# Patient Record
Sex: MALE | Race: WHITE | NOT HISPANIC OR LATINO
[De-identification: names, ages, dates, MRNs, and addresses within clinical notes are randomized per-mention and may not be internally consistent; named-entity substitution may affect disease eponyms.]

---

## 2023-04-05 ENCOUNTER — RX ONLY (OUTPATIENT)
Age: 58
Setting detail: RX ONLY
End: 2023-04-05

## 2023-07-06 ENCOUNTER — APPOINTMENT (RX ONLY)
Dept: RURAL CLINIC 4 | Facility: CLINIC | Age: 58
Setting detail: DERMATOLOGY
End: 2023-07-06

## 2023-07-06 DIAGNOSIS — L82.1 OTHER SEBORRHEIC KERATOSIS: ICD-10-CM

## 2023-07-06 DIAGNOSIS — L81.4 OTHER MELANIN HYPERPIGMENTATION: ICD-10-CM

## 2023-07-06 DIAGNOSIS — L57.3 POIKILODERMA OF CIVATTE: ICD-10-CM | Status: INADEQUATELY CONTROLLED

## 2023-07-06 DIAGNOSIS — D49.2 NEOPLASM OF UNSPECIFIED BEHAVIOR OF BONE, SOFT TISSUE, AND SKIN: ICD-10-CM

## 2023-07-06 DIAGNOSIS — D22 MELANOCYTIC NEVI: ICD-10-CM

## 2023-07-06 DIAGNOSIS — D18.0 HEMANGIOMA: ICD-10-CM

## 2023-07-06 DIAGNOSIS — L85.3 XEROSIS CUTIS: ICD-10-CM

## 2023-07-06 DIAGNOSIS — L57.8 OTHER SKIN CHANGES DUE TO CHRONIC EXPOSURE TO NONIONIZING RADIATION: ICD-10-CM | Status: STABLE

## 2023-07-06 PROBLEM — D18.01 HEMANGIOMA OF SKIN AND SUBCUTANEOUS TISSUE: Status: ACTIVE | Noted: 2023-07-06

## 2023-07-06 PROBLEM — D22.5 MELANOCYTIC NEVI OF TRUNK: Status: ACTIVE | Noted: 2023-07-06

## 2023-07-06 PROCEDURE — 11102 TANGNTL BX SKIN SINGLE LES: CPT

## 2023-07-06 PROCEDURE — ? COUNSELING

## 2023-07-06 PROCEDURE — ? SUNSCREEN RECOMMENDATIONS

## 2023-07-06 PROCEDURE — ? BIOPSY BY SHAVE METHOD

## 2023-07-06 PROCEDURE — 99203 OFFICE O/P NEW LOW 30 MIN: CPT | Mod: 25

## 2023-07-06 ASSESSMENT — LOCATION DETAILED DESCRIPTION DERM
LOCATION DETAILED: SUPERIOR THORACIC SPINE
LOCATION DETAILED: RIGHT PROXIMAL DORSAL FOREARM
LOCATION DETAILED: LEFT INFERIOR CENTRAL MALAR CHEEK
LOCATION DETAILED: RIGHT POSTERIOR SHOULDER
LOCATION DETAILED: SUPERIOR MID FOREHEAD
LOCATION DETAILED: LEFT LATERAL EYEBROW
LOCATION DETAILED: INFERIOR THORACIC SPINE
LOCATION DETAILED: LEFT PROXIMAL DORSAL FOREARM
LOCATION DETAILED: EPIGASTRIC SKIN
LOCATION DETAILED: LEFT INFERIOR LATERAL NECK
LOCATION DETAILED: LEFT POSTERIOR SHOULDER

## 2023-07-06 ASSESSMENT — LOCATION SIMPLE DESCRIPTION DERM
LOCATION SIMPLE: ABDOMEN
LOCATION SIMPLE: LEFT FOREARM
LOCATION SIMPLE: LEFT ANTERIOR NECK
LOCATION SIMPLE: UPPER BACK
LOCATION SIMPLE: RIGHT FOREARM
LOCATION SIMPLE: SUPERIOR FOREHEAD
LOCATION SIMPLE: LEFT EYEBROW
LOCATION SIMPLE: LEFT SHOULDER
LOCATION SIMPLE: RIGHT SHOULDER
LOCATION SIMPLE: LEFT CHEEK

## 2023-07-06 ASSESSMENT — LOCATION ZONE DERM
LOCATION ZONE: TRUNK
LOCATION ZONE: NECK
LOCATION ZONE: FACE
LOCATION ZONE: ARM

## 2023-07-06 NOTE — PROCEDURE: BIOPSY BY SHAVE METHOD
Detail Level: Detailed
Depth Of Biopsy: dermis
Was A Bandage Applied: Yes
Size Of Lesion In Cm: 0
Biopsy Type: H and E
Biopsy Method: Dermablade
Anesthesia Type: 1% lidocaine with epinephrine
Anesthesia Volume In Cc (Will Not Render If 0): 0.5
Hemostasis: Drysol
Wound Care: Petrolatum
Dressing: bandage
Destruction After The Procedure: No
Type Of Destruction Used: Curettage
Curettage Text: The wound bed was treated with curettage after the biopsy was performed.
Cryotherapy Text: The wound bed was treated with cryotherapy after the biopsy was performed.
Electrodesiccation Text: The wound bed was treated with electrodesiccation after the biopsy was performed.
Electrodesiccation And Curettage Text: The wound bed was treated with electrodesiccation and curettage after the biopsy was performed.
Silver Nitrate Text: The wound bed was treated with silver nitrate after the biopsy was performed.
Lab: [address] [place] [id number] [place]
Consent: Written consent was obtained and risks were reviewed including but not limited to scarring, infection, bleeding, scabbing, incomplete removal, nerve damage and allergy to anesthesia.
Post-Care Instructions: I reviewed with the patient in detail post-care instructions. Patient is to keep the biopsy site dry overnight, and then apply bacitracin twice daily until healed. Patient may apply hydrogen peroxide soaks to remove any crusting.
Notification Instructions: Patient will be notified of biopsy results. However, patient instructed to call the office if not contacted within 2 weeks.
Billing Type: [employer]
Information: Selecting Yes will display possible errors in your note based on the variables you have selected. This validation is only offered as a suggestion for you. PLEASE NOTE THAT THE VALIDATION TEXT WILL BE REMOVED WHEN YOU FINALIZE YOUR NOTE. IF YOU WANT TO FAX A PRELIMINARY NOTE YOU WILL NEED TO TOGGLE THIS TO 'NO' IF YOU DO NOT WANT IT IN YOUR FAXED NOTE.

## 2024-07-22 ENCOUNTER — APPOINTMENT (RX ONLY)
Dept: RURAL CLINIC 4 | Facility: CLINIC | Age: 59
Setting detail: DERMATOLOGY
End: 2024-07-22

## 2024-07-22 DIAGNOSIS — L82.1 OTHER SEBORRHEIC KERATOSIS: ICD-10-CM

## 2024-07-22 DIAGNOSIS — D18.0 HEMANGIOMA: ICD-10-CM

## 2024-07-22 DIAGNOSIS — D22 MELANOCYTIC NEVI: ICD-10-CM

## 2024-07-22 DIAGNOSIS — D49.2 NEOPLASM OF UNSPECIFIED BEHAVIOR OF BONE, SOFT TISSUE, AND SKIN: ICD-10-CM

## 2024-07-22 DIAGNOSIS — L57.8 OTHER SKIN CHANGES DUE TO CHRONIC EXPOSURE TO NONIONIZING RADIATION: ICD-10-CM | Status: STABLE

## 2024-07-22 DIAGNOSIS — L81.4 OTHER MELANIN HYPERPIGMENTATION: ICD-10-CM

## 2024-07-22 PROBLEM — D22.5 MELANOCYTIC NEVI OF TRUNK: Status: ACTIVE | Noted: 2024-07-22

## 2024-07-22 PROBLEM — D18.01 HEMANGIOMA OF SKIN AND SUBCUTANEOUS TISSUE: Status: ACTIVE | Noted: 2024-07-22

## 2024-07-22 PROCEDURE — ? SUNSCREEN RECOMMENDATIONS

## 2024-07-22 PROCEDURE — 11307 SHAVE SKIN LESION 1.1-2.0 CM: CPT

## 2024-07-22 PROCEDURE — ? COUNSELING

## 2024-07-22 PROCEDURE — 99213 OFFICE O/P EST LOW 20 MIN: CPT | Mod: 25

## 2024-07-22 PROCEDURE — ? SHAVE REMOVAL

## 2024-07-22 ASSESSMENT — LOCATION ZONE DERM
LOCATION ZONE: FACE
LOCATION ZONE: TRUNK
LOCATION ZONE: ARM
LOCATION ZONE: HAND

## 2024-07-22 ASSESSMENT — LOCATION SIMPLE DESCRIPTION DERM
LOCATION SIMPLE: RIGHT HAND
LOCATION SIMPLE: RIGHT UPPER BACK
LOCATION SIMPLE: LEFT UPPER BACK
LOCATION SIMPLE: LEFT FOREARM
LOCATION SIMPLE: LEFT FOREHEAD
LOCATION SIMPLE: RIGHT CHEEK

## 2024-07-22 ASSESSMENT — LOCATION DETAILED DESCRIPTION DERM
LOCATION DETAILED: RIGHT INFERIOR CENTRAL MALAR CHEEK
LOCATION DETAILED: RIGHT MID-UPPER BACK
LOCATION DETAILED: RIGHT RADIAL DORSAL HAND
LOCATION DETAILED: RIGHT INFERIOR UPPER BACK
LOCATION DETAILED: LEFT PROXIMAL DORSAL FOREARM
LOCATION DETAILED: LEFT INFERIOR MEDIAL FOREHEAD
LOCATION DETAILED: LEFT MID-UPPER BACK

## 2024-07-22 NOTE — PROCEDURE: SHAVE REMOVAL
Medical Necessity Information: It is in your best interest to select a reason for this procedure from the list below. All of these items fulfill various CMS LCD requirements except the new and changing color options.
Medical Necessity Clause: This procedure was medically necessary because the lesion that was treated was:
Lab: [id number]
Lab Facility: [id number]
Detail Level: Detailed
Was A Bandage Applied: Yes
Size Of Lesion In Cm (Required): 1.2
X Size Of Lesion In Cm (Optional): 0
Depth Of Shave: dermis
Biopsy Method: Dermablade
Anesthesia Type: 1% lidocaine with epinephrine
Hemostasis: Drysol
Wound Care: Petrolatum
Path Notes (To The Dermatopathologist): Please check margins. Thank you.
Render Path Notes In Note?: No
Consent was obtained from the patient. The provider's intent is to completely remove the lesion. The risks and benefits to therapy were discussed in detail. Specifically, the risks of infection, scarring, bleeding, prolonged wound healing, incomplete removal, allergy to anesthesia, nerve injury and recurrence were addressed. Prior to the procedure, the treatment site was clearly identified and confirmed by the patient. All components of Universal Protocol/PAUSE Rule completed.
Post-Care Instructions: I reviewed with the patient in detail post-care instructions. Patient is to keep the biopsy site dry overnight, and then apply bacitracin twice daily until healed. Patient may apply hydrogen peroxide soaks to remove any crusting.
Notification Instructions: Patient will be notified of pathology results. However, patient instructed to call the office if not contacted within 2 weeks.
Billing Type: Third-Party Bill

## 2025-07-22 ENCOUNTER — APPOINTMENT (OUTPATIENT)
Dept: RURAL CLINIC 4 | Facility: CLINIC | Age: 60
Setting detail: DERMATOLOGY
End: 2025-07-22

## 2025-07-22 DIAGNOSIS — L82.1 OTHER SEBORRHEIC KERATOSIS: ICD-10-CM

## 2025-07-22 DIAGNOSIS — L81.4 OTHER MELANIN HYPERPIGMENTATION: ICD-10-CM

## 2025-07-22 DIAGNOSIS — D18.0 HEMANGIOMA: ICD-10-CM

## 2025-07-22 DIAGNOSIS — D22 MELANOCYTIC NEVI: ICD-10-CM

## 2025-07-22 DIAGNOSIS — L57.8 OTHER SKIN CHANGES DUE TO CHRONIC EXPOSURE TO NONIONIZING RADIATION: ICD-10-CM | Status: STABLE

## 2025-07-22 DIAGNOSIS — D49.2 NEOPLASM OF UNSPECIFIED BEHAVIOR OF BONE, SOFT TISSUE, AND SKIN: ICD-10-CM

## 2025-07-22 PROBLEM — D18.01 HEMANGIOMA OF SKIN AND SUBCUTANEOUS TISSUE: Status: ACTIVE | Noted: 2025-07-22

## 2025-07-22 PROBLEM — D22.5 MELANOCYTIC NEVI OF TRUNK: Status: ACTIVE | Noted: 2025-07-22

## 2025-07-22 PROCEDURE — ? BIOPSY BY SHAVE METHOD

## 2025-07-22 PROCEDURE — ? SUNSCREEN RECOMMENDATIONS

## 2025-07-22 PROCEDURE — ? COUNSELING

## 2025-07-22 PROCEDURE — ?

## 2025-07-22 PROCEDURE — ?: Mod: 25

## 2025-07-22 ASSESSMENT — LOCATION ZONE DERM
LOCATION ZONE: ARM
LOCATION ZONE: TRUNK
LOCATION ZONE: FACE

## 2025-07-22 ASSESSMENT — LOCATION DETAILED DESCRIPTION DERM
LOCATION DETAILED: RIGHT MID-UPPER BACK
LOCATION DETAILED: LEFT INFERIOR MEDIAL FOREHEAD
LOCATION DETAILED: LEFT MID-UPPER BACK
LOCATION DETAILED: LEFT SUPERIOR LATERAL MIDBACK
LOCATION DETAILED: RIGHT INFERIOR CENTRAL MALAR CHEEK
LOCATION DETAILED: LEFT MEDIAL MALAR CHEEK
LOCATION DETAILED: LEFT PROXIMAL DORSAL FOREARM
LOCATION DETAILED: RIGHT INFERIOR UPPER BACK

## 2025-07-22 ASSESSMENT — LOCATION SIMPLE DESCRIPTION DERM
LOCATION SIMPLE: LEFT FOREARM
LOCATION SIMPLE: LEFT LOWER BACK
LOCATION SIMPLE: RIGHT UPPER BACK
LOCATION SIMPLE: RIGHT CHEEK
LOCATION SIMPLE: LEFT CHEEK
LOCATION SIMPLE: LEFT UPPER BACK
LOCATION SIMPLE: LEFT FOREHEAD

## 2025-07-22 NOTE — PROCEDURE: BIOPSY BY SHAVE METHOD
Body Location Override (Optional - Billing Will Still Be Based On Selected Body Map Location If Applicable): left medial malar cheek
Detail Level: Simple
Depth Of Biopsy: dermis
Was A Bandage Applied: Yes
Size Of Lesion In Cm: 0.5
Biopsy Type: H and E
Biopsy Method: Personna blade
Anesthesia Type: 1% lidocaine with 1:200,000 epinephrine and a 1:10 solution of 8.4% sodium bicarbonate
Anesthesia Volume In Cc: 0.6
Additional Anesthesia Volume In Cc (Will Not Render If 0): 0
Hemostasis: Aluminum Chloride
Wound Care: Vaseline
Dressing: pressure dressing with telfa
Destruction After The Procedure: No
Type Of Destruction Used: Curettage
Curettage Text: The wound bed was treated with curettage after the biopsy was performed.
Cryotherapy Text: The wound bed was treated with cryotherapy after the biopsy was performed.
Electrodesiccation Text: The wound bed was treated with electrodesiccation after the biopsy was performed.
Electrodesiccation And Curettage Text: The wound bed was treated with electrodesiccation and curettage after the biopsy was performed.
Silver Nitrate Text: The wound bed was treated with silver nitrate after the biopsy was performed.
Lab: [id number]
Medical Necessity Information: It is in your best interest to select a reason for this procedure from the list below. All of these items fulfill various CMS LCD requirements except the new and changing color options.
Consent: The provider's intent is to obtain a tissue sample solely for diagnostic purposes. Written consent to obtain tissue sample was obtained and risks were reviewed including but not limited to scarring, infection, bleeding, scabbing, incomplete removal, nerve damage and allergy to anesthesia.
Post-Care Instructions: I reviewed with the patient in detail post-care instructions. Patient is to keep the biopsy site dry overnight, and then apply bacitracin twice daily until healed. Patient may apply hydrogen peroxide soaks to remove any crusting.
Notification Instructions: Patient will be notified of biopsy results. However, patient instructed to call the office if not contacted within 2 weeks.
Billing Type: Third-Party Bill
Information: Selecting Yes will display possible errors in your note based on the variables you have selected. This validation is only offered as a suggestion for you. PLEASE NOTE THAT THE VALIDATION TEXT WILL BE REMOVED WHEN YOU FINALIZE YOUR NOTE. IF YOU WANT TO FAX A PRELIMINARY NOTE YOU WILL NEED TO TOGGLE THIS TO 'NO' IF YOU DO NOT WANT IT IN YOUR FAXED NOTE.
Body Location Override (Optional - Billing Will Still Be Based On Selected Body Map Location If Applicable): left medial lateral back
Size Of Lesion In Cm: 0.8
Lab: [id number]
Lab Facility: [id number]